# Patient Record
Sex: FEMALE | Race: WHITE
[De-identification: names, ages, dates, MRNs, and addresses within clinical notes are randomized per-mention and may not be internally consistent; named-entity substitution may affect disease eponyms.]

---

## 2021-07-20 ENCOUNTER — HOSPITAL ENCOUNTER (OUTPATIENT)
Dept: HOSPITAL 47 - RADMAMWWP | Age: 56
Discharge: HOME | End: 2021-07-20
Attending: FAMILY MEDICINE
Payer: COMMERCIAL

## 2021-07-20 DIAGNOSIS — Z12.31: Primary | ICD-10-CM

## 2021-07-20 PROCEDURE — 77067 SCR MAMMO BI INCL CAD: CPT

## 2021-07-20 NOTE — MM
Reason for exam: screening  (asymptomatic).

Last mammogram was performed 2 years and 4 months ago.



History:

Patient is postmenopausal.

Family history of breast cancer in maternal aunt.

Took hormonal contraceptives for 7 years.



Physical Findings:

A clinical breast exam by your physician is recommended on an annual basis and 

results should be correlated with mammographic findings.



MG Screening Mammo w CAD

Bilateral CC and MLO view(s) were taken.

Prior study comparison: March 7, 2019, mammogram, performed at [facility].  

June 14, 2016, mammogram, performed at [facility].

There are scattered fibroglandular densities.





ASSESSMENT: Negative, BI-RAD 1



RECOMMENDATION:

Routine screening mammogram of both breasts in 1 year.

## 2021-07-21 ENCOUNTER — HOSPITAL ENCOUNTER (OUTPATIENT)
Dept: HOSPITAL 47 - RADNMMAIN | Age: 56
Discharge: HOME | End: 2021-07-21
Attending: FAMILY MEDICINE
Payer: COMMERCIAL

## 2021-07-21 DIAGNOSIS — I10: ICD-10-CM

## 2021-07-21 DIAGNOSIS — Z82.49: ICD-10-CM

## 2021-07-21 DIAGNOSIS — R94.39: Primary | ICD-10-CM

## 2021-07-21 DIAGNOSIS — E78.2: ICD-10-CM

## 2021-07-21 PROCEDURE — 93017 CV STRESS TEST TRACING ONLY: CPT
